# Patient Record
Sex: MALE | Race: WHITE | NOT HISPANIC OR LATINO | Employment: FULL TIME | URBAN - METROPOLITAN AREA
[De-identification: names, ages, dates, MRNs, and addresses within clinical notes are randomized per-mention and may not be internally consistent; named-entity substitution may affect disease eponyms.]

---

## 2021-04-19 ENCOUNTER — IMMUNIZATION (OUTPATIENT)
Dept: FAMILY MEDICINE | Facility: CLINIC | Age: 61
End: 2021-04-19
Payer: COMMERCIAL

## 2021-04-19 PROCEDURE — 91301 PR COVID VAC MODERNA 100 MCG/0.5 ML IM: CPT

## 2021-04-19 PROCEDURE — 0011A PR COVID VAC MODERNA 100 MCG/0.5 ML IM: CPT

## 2021-05-17 ENCOUNTER — IMMUNIZATION (OUTPATIENT)
Dept: FAMILY MEDICINE | Facility: CLINIC | Age: 61
End: 2021-05-17
Attending: FAMILY MEDICINE
Payer: COMMERCIAL

## 2021-05-17 PROCEDURE — 0012A PR COVID VAC MODERNA 100 MCG/0.5 ML IM: CPT

## 2021-05-17 PROCEDURE — 91301 PR COVID VAC MODERNA 100 MCG/0.5 ML IM: CPT

## 2021-05-23 ENCOUNTER — HEALTH MAINTENANCE LETTER (OUTPATIENT)
Age: 61
End: 2021-05-23

## 2021-09-12 ENCOUNTER — HEALTH MAINTENANCE LETTER (OUTPATIENT)
Age: 61
End: 2021-09-12

## 2022-06-19 ENCOUNTER — HEALTH MAINTENANCE LETTER (OUTPATIENT)
Age: 62
End: 2022-06-19

## 2022-11-19 ENCOUNTER — HEALTH MAINTENANCE LETTER (OUTPATIENT)
Age: 62
End: 2022-11-19

## 2023-01-25 ENCOUNTER — HOSPITAL ENCOUNTER (EMERGENCY)
Facility: CLINIC | Age: 63
Discharge: HOME | End: 2023-01-25
Attending: FAMILY MEDICINE | Admitting: FAMILY MEDICINE
Payer: COMMERCIAL

## 2023-01-25 ENCOUNTER — NURSE TRIAGE (OUTPATIENT)
Dept: FAMILY MEDICINE | Facility: CLINIC | Age: 63
End: 2023-01-25
Payer: COMMERCIAL

## 2023-01-25 VITALS
BODY MASS INDEX: 26.48 KG/M2 | HEIGHT: 70 IN | TEMPERATURE: 98.1 F | HEART RATE: 107 BPM | WEIGHT: 185 LBS | OXYGEN SATURATION: 96 % | RESPIRATION RATE: 18 BRPM | DIASTOLIC BLOOD PRESSURE: 116 MMHG | SYSTOLIC BLOOD PRESSURE: 180 MMHG

## 2023-01-25 DIAGNOSIS — I10 HYPERTENSION, UNSPECIFIED TYPE: ICD-10-CM

## 2023-01-25 PROCEDURE — 99283 EMERGENCY DEPT VISIT LOW MDM: CPT | Performed by: FAMILY MEDICINE

## 2023-01-25 RX ORDER — LISINOPRIL/HYDROCHLOROTHIAZIDE 10-12.5 MG
1 TABLET ORAL DAILY
Qty: 30 TABLET | Refills: 0 | Status: SHIPPED | OUTPATIENT
Start: 2023-01-25 | End: 2023-02-09

## 2023-01-25 NOTE — ED TRIAGE NOTES
BP fund to be high at dentist called clinic cant get in until February 9th, clinic advised he be seen today and start on meds.  Pt greg any pain dizziness SOB or chest pain. Reports he is the only one in family not on BP meds     Triage Assessment     Row Name 01/25/23 1437       Triage Assessment (Adult)    Airway WDL WDL       Respiratory WDL    Respiratory WDL WDL       Skin Circulation/Temperature WDL    Skin Circulation/Temperature WDL WDL       Cardiac WDL    Cardiac WDL WDL       Peripheral/Neurovascular WDL    Peripheral Neurovascular WDL WDL       Cognitive/Neuro/Behavioral WDL    Cognitive/Neuro/Behavioral WDL WDL

## 2023-01-25 NOTE — TELEPHONE ENCOUNTER
Provider Response to 2nd Level Triage Request    I have reviewed the RN documentation. My recommendation is:  Patient cannot wait until the establish care appointment.  He needs to go to urgent care or emergency department today.  He needs evaluation and start of medication to lower his blood pressure     Cory Raphael MD  Family Medicine

## 2023-01-25 NOTE — TELEPHONE ENCOUNTER
Dr. Raphael,    This patient has not seen a doctor in some 9 1/2 yrs with HTN now.  His bp was 174/110 at the dentis office.  I did call this patient. He denies cp, blurred vision, HA, SOA, difficulty using arms or legs and slurred speech.  He did schedule for an appt to establish care on 2/9/23.  Is this soon enough? I did tell him S & S to watch for and what to go to the ER for. Ana BURGOS RN

## 2023-01-25 NOTE — TELEPHONE ENCOUNTER
"174/110 this AM at the dentist - patient hasn't seen a provider at [place] in about 10 years but would like to re-establish care with a provider at the [facility].     Please advise and help patient schedule.     Callback number: [phone].    Reason for Disposition    Systolic BP >= 180 OR Diastolic >= 110    Additional Information    Negative: Sounds like a life-threatening emergency to the triager    Negative: Pregnant > 20 weeks or postpartum (< 6 weeks after delivery) and new hand or face swelling    Negative: Pregnant > 20 weeks and BP > 140/90    Negative: Systolic BP >= 160 OR Diastolic >= 100, and any cardiac or neurologic symptoms (e.g., chest pain, difficulty breathing, unsteady gait, blurred vision)    Negative: Patient sounds very sick or weak to the triager    Negative: BP Systolic BP >= 140 OR Diastolic >= 90 and postpartum (from 0 to 6 weeks after delivery)    Negative: Systolic BP >= 180 OR Diastolic >= 110, and missed most recent dose of blood pressure medication    Answer Assessment - Initial Assessment Questions  1. BLOOD PRESSURE: \"What is the blood pressure?\" \"Did you take at least two measurements 5 minutes apart?\"      174/110 - taken at the dentist.   2. ONSET: \"When did you take your blood pressure?\"      8 AM.   3. HOW: \"How did you obtain the blood pressure?\" (e.g., visiting nurse, automatic home BP monitor)      At the dentist - automatic.   4. HISTORY: \"Do you have a history of high blood pressure?\"      Patient reports his BP usually runs 140's - 150's.   5. MEDICATIONS: \"Are you taking any medications for blood pressure?\" \"Have you missed any doses recently?\"      NO   6. OTHER SYMPTOMS: \"Do you have any symptoms?\" (e.g., headache, chest pain, blurred vision, difficulty breathing, weakness)      None of the above.   7. PREGNANCY: \"Is there any chance you are pregnant?\" \"When was your last menstrual period?\"      N/a    Protocols used: HIGH BLOOD PRESSURE-A-VENESSA Thapa, " BRIA  [facility]

## 2023-01-26 NOTE — DISCHARGE INSTRUCTIONS
RETURN TO THE EMERGENCY ROOM FOR THE FOLLOWING:    Severely worsened breathing, new chest pain, severe headache or neck pain, one-sided weakness or incoordination, trouble with speech, new changes in vision, vertigo and vomiting, or at anytime for any concern.    FOLLOW UP:    With your primary clinic as scheduled.    TREATMENT RECOMMENDATIONS:    Lisinopril/hydrochlorothiazide.    NURSE ADVICE LINE:  [phone] or [phone]

## 2023-01-26 NOTE — ED PROVIDER NOTES
HPI   The patient is a 62-year-old male presenting for concerns about blood pressure.  He was at the dentist today when they found that his blood pressure was quite high.  They recommended he be seen for high blood pressure and consider starting medication.  Patient tells me that he has high blood pressure and that it is usually about 150/80.  The last time he measured his blood pressure though was 6 months ago.  He denies chest pain or trouble with breathing.  No difficulties with exertion.  No palpitations or lightheadedness or fainting.  No severe headache or neck pain.  No back, neck, jaw, or arm pain.  No one-sided weakness or incoordination.  No vertigo or repeated vomiting.  No visual changes.  No trouble with speech.  No facial droop.        Allergies:  Allergies   Allergen Reactions     Nkda [No Known Drug Allergies]      Problem List:    Patient Active Problem List    Diagnosis Date Noted     CARDIOVASCULAR SCREENING; LDL GOAL LESS THAN 130 11/21/2012     Priority: Medium     Inguinal ring laxity 10/31/2011     Priority: Medium     Erectile dysfunction 10/31/2011     Priority: Medium     Carpal tunnel syndrome 02/14/2007     Priority: Medium      Past Medical History:    History reviewed. No pertinent past medical history.  Past Surgical History:    Past Surgical History:   Procedure Laterality Date     CARPAL TUNNEL RELEASE RT/LT  1-12-12    Right side; MO CasanovaALICIA.      CARPAL TUNNEL RELEASE RT/LT  2008    MO MunroeALICIA     RELEASE CARPAL TUNNEL  1/12/2012    Procedure:RELEASE CARPAL TUNNEL; Right Carpal Tunnel Release; Surgeon:REGULO THOMAS; Location:[place] OR     VASECTOMY  10/6/1989     Family History:    Family History   Problem Relation Age of Onset     Diabetes Mother      Heart Disease Father         MI at age 62.     Lipids Father      Social History:  Marital Status:   [4]  Social History     Tobacco Use     Smoking status: Former     Packs/day: 0.00     Types:  "Cigarettes     Smokeless tobacco: Never     Tobacco comments:     Quit in March 2008.   Substance Use Topics     Alcohol use: Yes     Comment: 5 beers per week.  None [id number] visit.     Drug use: No      Medications:    lisinopril-hydrochlorothiazide (ZESTORETIC) 10-12.5 MG tablet  doxycycline (VIBRAMYCIN) 100 MG capsule  ORDER FOR DME  tadalafil (CIALIS) 10 MG tablet      Review of Systems   All other systems reviewed and are negative.      PE   BP: (!) 188/116  Pulse: (!) 125  Temp: 98.1  F (36.7  C)  Resp: 18  Height: 177.8 cm (5' 10\")  Weight: 83.9 kg (185 lb)  SpO2: 98 %  Physical Exam  Vitals and nursing note reviewed.   Constitutional:       General: He is not in acute distress.  HENT:      Head: Atraumatic.      Right Ear: External ear normal.      Left Ear: External ear normal.      Nose: Nose normal.      Mouth/Throat:      Mouth: Mucous membranes are moist.      Pharynx: Oropharynx is clear.   Eyes:      General: No scleral icterus.     Extraocular Movements: Extraocular movements intact.      Conjunctiva/sclera: Conjunctivae normal.      Pupils: Pupils are equal, round, and reactive to light.   Cardiovascular:      Rate and Rhythm: Normal rate.   Pulmonary:      Effort: Pulmonary effort is normal. No respiratory distress.   Musculoskeletal:         General: Normal range of motion.      Cervical back: Normal range of motion.   Skin:     General: Skin is warm and dry.   Neurological:      Mental Status: He is alert and oriented to person, place, and time.   Psychiatric:         Behavior: Behavior normal.         ED COURSE and [facility]   1839.  The patient has high blood pressure here in the ED.  He is without symptoms of endorgan involvement.  The patient would like to start blood pressure medication.  Lisinopril/hydrochlorothiazide ordered.  No blood work or other work-up at this time.  Follow-up is already scheduled on 2/9.  Return here for worsening as discussed.    1840.  The patient, their parent if " applicable, and/or their medical decision maker(s) and I have reviewed all of the available historical information, applicable PMH, physical exam findings, and objective diagnostic data gathered during this ED visit.  We then discussed all work-up options and then together agreed upon the course taken during this visit.  The ultimate disposition and plan was a cooperative decision made between myself and the patient, their parent if applicable, and/or their legal decision maker(s).  The risks and benefits of all decisions made during this visit were discussed to the best of my abilities given the circumstances, and all parties are understanding of the pertinent ramifications of these decisions.      LABS  Labs Ordered and Resulted from Time of ED Arrival to Time of ED Departure - No data to display    IMAGING  Images reviewed by me.  Radiology report also reviewed.  No orders to display       Procedures    Medications - No data to display      IMPRESSION       ICD-10-CM    1. Hypertension, unspecified type  I10                Medication List      Started    lisinopril-hydrochlorothiazide 10-12.5 MG tablet  Commonly known as: ZESTORETIC  1 tablet, Oral, DAILY                        New Gomez MD  01/25/23 4732

## 2023-02-09 ENCOUNTER — OFFICE VISIT (OUTPATIENT)
Dept: FAMILY MEDICINE | Facility: CLINIC | Age: 63
End: 2023-02-09
Payer: COMMERCIAL

## 2023-02-09 VITALS
HEIGHT: 70 IN | WEIGHT: 184.7 LBS | SYSTOLIC BLOOD PRESSURE: 138 MMHG | TEMPERATURE: 98.1 F | RESPIRATION RATE: 16 BRPM | HEART RATE: 82 BPM | DIASTOLIC BLOOD PRESSURE: 80 MMHG | OXYGEN SATURATION: 99 % | BODY MASS INDEX: 26.44 KG/M2

## 2023-02-09 DIAGNOSIS — Z11.4 SCREENING FOR HIV (HUMAN IMMUNODEFICIENCY VIRUS): ICD-10-CM

## 2023-02-09 DIAGNOSIS — Z11.59 NEED FOR HEPATITIS C SCREENING TEST: ICD-10-CM

## 2023-02-09 DIAGNOSIS — Z12.11 SCREEN FOR COLON CANCER: ICD-10-CM

## 2023-02-09 DIAGNOSIS — Z13.220 SCREENING FOR HYPERLIPIDEMIA: ICD-10-CM

## 2023-02-09 DIAGNOSIS — I10 BENIGN ESSENTIAL HTN: ICD-10-CM

## 2023-02-09 DIAGNOSIS — Z23 NEED FOR VACCINATION: Primary | ICD-10-CM

## 2023-02-09 LAB
ANION GAP SERPL CALCULATED.3IONS-SCNC: 11 MMOL/L (ref 7–15)
BUN SERPL-MCNC: 17.4 MG/DL (ref 8–23)
CALCIUM SERPL-MCNC: 9.7 MG/DL (ref 8.8–10.2)
CHLORIDE SERPL-SCNC: 90 MMOL/L (ref 98–107)
CHOLEST SERPL-MCNC: 208 MG/DL
CREAT SERPL-MCNC: 1.21 MG/DL (ref 0.67–1.17)
DEPRECATED HCO3 PLAS-SCNC: 27 MMOL/L (ref 22–29)
GFR SERPL CREATININE-BSD FRML MDRD: 68 ML/MIN/1.73M2
GLUCOSE SERPL-MCNC: 111 MG/DL (ref 70–99)
HDLC SERPL-MCNC: 61 MG/DL
LDLC SERPL CALC-MCNC: 134 MG/DL
NONHDLC SERPL-MCNC: 147 MG/DL
POTASSIUM SERPL-SCNC: 4.3 MMOL/L (ref 3.4–5.3)
SODIUM SERPL-SCNC: 128 MMOL/L (ref 136–145)
TRIGL SERPL-MCNC: 64 MG/DL

## 2023-02-09 PROCEDURE — 87389 HIV-1 AG W/HIV-1&-2 AB AG IA: CPT | Performed by: STUDENT IN AN ORGANIZED HEALTH CARE EDUCATION/TRAINING PROGRAM

## 2023-02-09 PROCEDURE — 90750 HZV VACC RECOMBINANT IM: CPT | Performed by: STUDENT IN AN ORGANIZED HEALTH CARE EDUCATION/TRAINING PROGRAM

## 2023-02-09 PROCEDURE — 99203 OFFICE O/P NEW LOW 30 MIN: CPT | Mod: 25 | Performed by: STUDENT IN AN ORGANIZED HEALTH CARE EDUCATION/TRAINING PROGRAM

## 2023-02-09 PROCEDURE — 80061 LIPID PANEL: CPT | Performed by: STUDENT IN AN ORGANIZED HEALTH CARE EDUCATION/TRAINING PROGRAM

## 2023-02-09 PROCEDURE — 36415 COLL VENOUS BLD VENIPUNCTURE: CPT | Performed by: STUDENT IN AN ORGANIZED HEALTH CARE EDUCATION/TRAINING PROGRAM

## 2023-02-09 PROCEDURE — 80048 BASIC METABOLIC PNL TOTAL CA: CPT | Performed by: STUDENT IN AN ORGANIZED HEALTH CARE EDUCATION/TRAINING PROGRAM

## 2023-02-09 PROCEDURE — 86803 HEPATITIS C AB TEST: CPT | Performed by: STUDENT IN AN ORGANIZED HEALTH CARE EDUCATION/TRAINING PROGRAM

## 2023-02-09 PROCEDURE — 90471 IMMUNIZATION ADMIN: CPT | Performed by: STUDENT IN AN ORGANIZED HEALTH CARE EDUCATION/TRAINING PROGRAM

## 2023-02-09 RX ORDER — LISINOPRIL/HYDROCHLOROTHIAZIDE 10-12.5 MG
1 TABLET ORAL DAILY
Qty: 90 TABLET | Refills: 3 | Status: SHIPPED | OUTPATIENT
Start: 2023-02-09 | End: 2024-01-26

## 2023-02-09 RX ORDER — LISINOPRIL/HYDROCHLOROTHIAZIDE 10-12.5 MG
1 TABLET ORAL DAILY
Qty: 30 TABLET | Refills: 0 | Status: SHIPPED | OUTPATIENT
Start: 2023-02-09 | End: 2023-02-09

## 2023-02-09 ASSESSMENT — PAIN SCALES - GENERAL: PAINLEVEL: NO PAIN (0)

## 2023-02-09 NOTE — NURSING NOTE
Immunizations Administered     Name Date Dose VIS Date Route    Zoster vaccine recombinant adjuvanted (SHINGRIX) 2/9/23  8:12 AM 0.5 mL 10/30/2019, Given Today Intramuscular        After obtaining informed consent, the SHINGRIX shingles immunization is given in the left deltoid. Patient verified all immunization questions prior to vaccination. Patient is remaining in clinic for 15 minutes to monitor for adverse reactions.    MO Álvarez LPN on 2/9/2023 at 8:29 AM\

## 2023-02-09 NOTE — PROGRESS NOTES
"  1. Benign essential HTN  - refilled lisinopril-hydrochlorothiazide (ZESTORETIC) 10-12.5 MG tablet; Take 1 tablet by mouth daily  Dispense: 90 tablet; Refill: 3  - Basic metabolic panel  (Ca, Cl, CO2, Creat, Gluc, K, Na, BUN); Future    2. Need for vaccination  - administered today   - ZOSTER VACCINE RECOMBINANT ADJUVANTED (SHINGRIX)    3. Screening for HIV (human immunodeficiency virus)  - HIV Antigen Antibody Combo; Future    4. Need for hepatitis C screening test  - Hepatitis C Screen Reflex to HCV RNA Quant and Genotype; Future    5. Screening for hyperlipidemia  - Lipid panel reflex to direct LDL Non-fasting; Future    6. Screen for colon cancer  - can discuss at annual physical in addition to PSA       Subjective   Al is a 62 year old, presenting for the following health issues:  Hypertension, Establish Care, and Recheck Medication      History of Present Illness       Hypertension: He presents for follow up of hypertension.  He does check blood pressure  regularly outside of the clinic. Outpatient blood pressures have not been over 140/90. He does not follow a low salt diet.       Medication Followup of Lisinopril-hydrochlorothiazide 10-12.5mg    Taking Medication as prescribed: yes    Side Effects:  None    Medication Helping Symptoms:  yes        Review of Systems   Denies headache, vision changes, chest pain, shortness of breath   As above       Objective    /80 (BP Location: Left arm, Patient Position: Sitting, Cuff Size: Adult Large)   Pulse 82   Temp 98.1  F (36.7  C) (Tympanic)   Resp 16   Ht 1.778 m (5' 10\")   Wt 83.8 kg (184 lb 11.2 oz)   SpO2 99%   BMI 26.50 kg/m    Body mass index is 26.5 kg/m .  Physical Exam  Constitutional:       General: He is not in acute distress.  HENT:      Head: Normocephalic and atraumatic.      Right Ear: External ear normal.      Left Ear: External ear normal.      Mouth/Throat:      Mouth: Mucous membranes are moist.      Pharynx: Oropharynx is clear. No " oropharyngeal exudate or posterior oropharyngeal erythema.   Eyes:      Extraocular Movements: Extraocular movements intact.   Cardiovascular:      Rate and Rhythm: Normal rate and regular rhythm.      Pulses: Normal pulses.      Heart sounds: Normal heart sounds. No murmur heard.    No friction rub. No gallop.   Pulmonary:      Effort: Pulmonary effort is normal. No respiratory distress.      Breath sounds: Normal breath sounds. No wheezing or rhonchi.   Abdominal:      Palpations: Abdomen is soft. There is no mass.      Tenderness: There is no abdominal tenderness.   Musculoskeletal:         General: No deformity. Normal range of motion.      Cervical back: Normal range of motion and neck supple.   Skin:     General: Skin is warm.      Findings: No rash.   Neurological:      General: No focal deficit present.      Mental Status: He is alert and oriented to person, place, and time.   Psychiatric:         Mood and Affect: Mood normal.

## 2023-02-10 DIAGNOSIS — E78.2 MIXED HYPERLIPIDEMIA: ICD-10-CM

## 2023-02-10 DIAGNOSIS — E87.1 HYPONATREMIA: Primary | ICD-10-CM

## 2023-02-10 LAB
HCV AB SERPL QL IA: NONREACTIVE
HIV 1+2 AB+HIV1 P24 AG SERPL QL IA: NONREACTIVE

## 2023-02-10 RX ORDER — ATORVASTATIN CALCIUM 40 MG/1
40 TABLET, FILM COATED ORAL DAILY
Qty: 90 TABLET | Refills: 1 | Status: SHIPPED | OUTPATIENT
Start: 2023-02-10 | End: 2024-01-02

## 2023-02-10 NOTE — RESULT ENCOUNTER NOTE
Kelsi Monson,     It was a pleasure speaking with you. I have received and reviewed your lab results, and have the following recommendations:     Congratulations!  Test negative for hepatitis C and HIV.  Your BMP did show that you have an elevated creatinine and a mildly decreased sodium.  At this time I would recommend keeping yourself adequately hydrated getting at least 64 ounces of daily water intake. I do kindly request that you provide repeat blood work in the next 2 weeks to ensure that your sodium and creatinine are stabilizing and that no further work-up would be needed.  I placed an order for this to be done you does have to call the lab and make an appointment.    Finally your cholesterol was mildly elevated.  I think you would benefit from a medication called atorvastatin.  I will place an order over to your pharmacy.  Some people on this medication notice muscle pains if you do notice this, please stop taking the atorvastatin.    Try to limit your dietary intake of fatty, greasy, oily, fried, take out, and fast food when possible. Also, I would suggest you cut back on red and processed meats, sodium and sugar-sweetened foods and beverages. Regarding exercise, please get at least 30 minutes of CONTINUOUS moderate intensity aerobic exercise at least 3 times per week.    Sincerely,     Claudette Gayle MD

## 2023-02-24 ENCOUNTER — LAB (OUTPATIENT)
Dept: LAB | Facility: CLINIC | Age: 63
End: 2023-02-24
Payer: COMMERCIAL

## 2023-02-24 DIAGNOSIS — E87.1 HYPONATREMIA: ICD-10-CM

## 2023-02-24 LAB
ALBUMIN SERPL BCG-MCNC: 4.5 G/DL (ref 3.5–5.2)
ALP SERPL-CCNC: 63 U/L (ref 40–129)
ALT SERPL W P-5'-P-CCNC: 30 U/L (ref 10–50)
ANION GAP SERPL CALCULATED.3IONS-SCNC: 12 MMOL/L (ref 7–15)
AST SERPL W P-5'-P-CCNC: 31 U/L (ref 10–50)
BILIRUB SERPL-MCNC: 1.1 MG/DL
BUN SERPL-MCNC: 19.7 MG/DL (ref 8–23)
CALCIUM SERPL-MCNC: 9.5 MG/DL (ref 8.8–10.2)
CHLORIDE SERPL-SCNC: 93 MMOL/L (ref 98–107)
CREAT SERPL-MCNC: 1.36 MG/DL (ref 0.67–1.17)
DEPRECATED HCO3 PLAS-SCNC: 24 MMOL/L (ref 22–29)
GFR SERPL CREATININE-BSD FRML MDRD: 59 ML/MIN/1.73M2
GLUCOSE SERPL-MCNC: 105 MG/DL (ref 70–99)
POTASSIUM SERPL-SCNC: 4.4 MMOL/L (ref 3.4–5.3)
PROT SERPL-MCNC: 7.1 G/DL (ref 6.4–8.3)
SODIUM SERPL-SCNC: 129 MMOL/L (ref 136–145)

## 2023-02-24 PROCEDURE — 80053 COMPREHEN METABOLIC PANEL: CPT

## 2023-02-24 PROCEDURE — 36415 COLL VENOUS BLD VENIPUNCTURE: CPT

## 2023-02-24 NOTE — RESULT ENCOUNTER NOTE
Hello!     Whenever anybody gets a chance could someone please call this patient and schedule him to see me either in person or via a virtual visit to discuss his kidney function?  He already got repeat lab work and his creatinine appears to be increasing so I would like to follow-up with the patient to see what can potentially be causing these abnormalities.    Thank you so much!     Sincerely,     Ana Gayle MD            Dear Al,     Thank you so much for completing those repeat labs like I requested.  Unfortunately, appears that your creatinine continues to remain elevated.  At this time I think it might be best if we have an appointment together face-to-face or virtual is fine just to figure out what exactly could potentially be causing this elevated creatinine.  Sometimes the patient has longstanding blood pressure that can result in kidney disease over time.  However based on our last encounter it appears that your blood pressure was normal that day.    Given the fact that your kidney function appears to be slightly worse than before, the next time we have our appointment together I will also place an order for a urine albumin study to make sure that your kidneys can still filter out protein appropriately.  Otherwise, you may need a referral to a kidney specialist.    Please let us know if you have any questions or concerns.  I will have somebody from clinic make an appointment with you to see me again.    Regards,  ANA GAYLE MD

## 2023-04-10 ENCOUNTER — ALLIED HEALTH/NURSE VISIT (OUTPATIENT)
Dept: FAMILY MEDICINE | Facility: CLINIC | Age: 63
End: 2023-04-10
Payer: COMMERCIAL

## 2023-04-10 DIAGNOSIS — Z23 ENCOUNTER FOR IMMUNIZATION: Primary | ICD-10-CM

## 2023-04-10 PROCEDURE — 90750 HZV VACC RECOMBINANT IM: CPT

## 2023-04-10 PROCEDURE — 90471 IMMUNIZATION ADMIN: CPT

## 2023-04-10 PROCEDURE — 99207 PR NO CHARGE NURSE ONLY: CPT

## 2023-04-10 NOTE — PROGRESS NOTES
Prior to immunization administration, verified patients identity using patient s name and date of birth. Please see Immunization Activity for additional information.     Screening Questionnaire for Adult Immunization    Are you sick today?   No   Do you have allergies to medications, food, a vaccine component or latex?   No   Have you ever had a serious reaction after receiving a vaccination?   No   Do you have a long-term health problem with heart, lung, kidney, or metabolic disease (e.g., diabetes), asthma, a blood disorder, no spleen, complement component deficiency, a cochlear implant, or a spinal fluid leak?  Are you on long-term aspirin therapy?   No   Do you have cancer, leukemia, HIV/AIDS, or any other immune system problem?   No   Do you have a parent, brother, or sister with an immune system problem?   No   In the past 3 months, have you taken medications that affect  your immune system, such as prednisone, other steroids, or anticancer drugs; drugs for the treatment of rheumatoid arthritis, Crohn s disease, or psoriasis; or have you had radiation treatments?   No   Have you had a seizure, or a brain or other nervous system problem?   No   During the past year, have you received a transfusion of blood or blood    products, or been given immune (gamma) globulin or antiviral drug?   No   For women: Are you pregnant or is there a chance you could become       pregnant during the next month?   No   Have you received any vaccinations in the past 4 weeks?   No     Immunization questionnaire answers were all negative.    I have reviewed the following standing orders:   This patient is due and qualifies for the Zoster vaccine.    Click here for Zoster Standing Order    I have reviewed the vaccines inclusion and exclusion criteria; No concerns regarding eligibility.         Injection of shingrix given by Cassi Perez CMA. Patient instructed to remain in clinic for 15 minutes afterwards, and to report any  adverse reactions.     Screening performed by Cassi Perez CMA on 4/10/2023 at 8:21 AM.

## 2023-04-14 ENCOUNTER — TELEPHONE (OUTPATIENT)
Dept: FAMILY MEDICINE | Facility: CLINIC | Age: 63
End: 2023-04-14
Payer: COMMERCIAL

## 2023-04-14 NOTE — TELEPHONE ENCOUNTER
Patient Quality Outreach    Patient is due for the following:   Colon Cancer Screening  Physical Preventive Adult Physical    Next Steps:   Schedule a Adult Preventative    Type of outreach:    Sent letter.    Next Steps:  Reach out within 90 days via Letter.    Max number of attempts reached: No. Will try again in 90 days if patient still on fail list.    Questions for provider review:    None     Caren LOUIS LPN

## 2023-04-14 NOTE — LETTER
April 14, 2023      Ke Monson  [address] [address]    Your team at [facility] cares about your health. We have reviewed your chart and based on our findings; we are making the following recommendations to better manage your health.     You are in particular need of attention regarding the following:     Call or MyChart message your clinic to schedule a colonoscopy, schedule/ a FIT Test, or order a Cologuard test. If you are unsure what type of test you need, please call your clinic and speak to clinic staff.   Colon cancer is now the second leading cause of cancer-related deaths in the [facility] for both men and women and there are over 130,000 new cases and 50,000 deaths per year from colon cancer. Colonoscopies can prevent 90-95% of these deaths. Problem lesions can be removed before they ever become cancer. This test is not only looking for cancer, but also getting rid of precancerous lesions.   If you are under/uninsured, we recommend you contact the [employer] Program.[employer] is a free colorectal cancer screening program that provides colonoscopies for eligible under/uninsured [place] men and women. If you are interested in receiving a free colonoscopy, please call [employer] at t [phone] (mention code [employer]) to see if you're eligible. Please have them send us the results.   PREVENTATIVE VISIT: Physical    If you have already completed these items, please contact the clinic via phone or   [email or web address] so your care team can review and update your records. Thank you for   choosing [facility]ics for your healthcare needs. For any questions,   concerns, or to schedule an appointment please contact our clinic at [phone].    Healthy Regards,      Your [facility] Care Team

## 2023-07-02 ENCOUNTER — HEALTH MAINTENANCE LETTER (OUTPATIENT)
Age: 63
End: 2023-07-02

## 2023-08-01 ENCOUNTER — TELEPHONE (OUTPATIENT)
Dept: FAMILY MEDICINE | Facility: CLINIC | Age: 63
End: 2023-08-01
Payer: COMMERCIAL

## 2023-08-01 NOTE — LETTER
August 1, 2023      Ke Monson  [address] [address]    Your team at [facility] cares about your health. We have reviewed your chart and based on our findings; we are making the following recommendations to better manage your health.     You are in particular need of attention regarding the following:     HYPERTENSION FOLLOW UP: Office Visit  Call or MyChart message your clinic to schedule a colonoscopy, schedule/ a FIT Test, or order a Cologuard test. If you are unsure what type of test you need, please call your clinic and speak to clinic staff.   Colon cancer is now the second leading cause of cancer-related deaths in the [facility] for both men and women and there are over 130,000 new cases and 50,000 deaths per year from colon cancer. Colonoscopies can prevent 90-95% of these deaths. Problem lesions can be removed before they ever become cancer. This test is not only looking for cancer, but also getting rid of precancerous lesions.   If you are under/uninsured, we recommend you contact the [employer] Program.[employer] is a free colorectal cancer screening program that provides colonoscopies for eligible under/uninsured [place] men and women. If you are interested in receiving a free colonoscopy, please call [employer] at t [phone] (mention code [employer]) to see if you're eligible. Please have them send us the results.   PREVENTATIVE VISIT: Physical    If you have already completed these items, please contact the clinic via phone or   [email or web address] so your care team can review and update your records. Thank you for   choosing [facility] Clinics for your healthcare needs. For any questions,   concerns, or to schedule an appointment please contact our clinic at [phone].    Healthy Regards,      Your [facility] Care Team

## 2023-08-01 NOTE — TELEPHONE ENCOUNTER
Patient Quality Outreach    Patient is due for the following:   Hypertension -  Hypertension follow-up visit  Colon Cancer Screening  Physical Preventive Adult Physical    Next Steps:   Schedule a Adult Preventative    Type of outreach:    Sent letter.    Next Steps:  Reach out within 90 days via Letter.    Max number of attempts reached: Yes. Will try again in 90 days if patient still on fail list.    Questions for provider review:    None           Caren LOUIS LPN

## 2023-11-24 ENCOUNTER — TELEPHONE (OUTPATIENT)
Dept: FAMILY MEDICINE | Facility: CLINIC | Age: 63
End: 2023-11-24
Payer: COMMERCIAL

## 2023-11-24 NOTE — TELEPHONE ENCOUNTER
Patient Quality Outreach    Patient is due for the following:   Colon Cancer Screening  Physical Preventive Adult Physical    Next Steps:   Schedule a Adult Preventative    Type of outreach:    Sent [employer] message.    Next Steps:  Reach out within 90 days via Letter.    Max number of attempts reached: Yes. Will try again in 90 days if patient still on fail list.    Questions for provider review:    None           Caren LOUIS LPN

## 2024-01-02 ENCOUNTER — MYC REFILL (OUTPATIENT)
Dept: FAMILY MEDICINE | Facility: CLINIC | Age: 64
End: 2024-01-02
Payer: COMMERCIAL

## 2024-01-02 DIAGNOSIS — E78.2 MIXED HYPERLIPIDEMIA: ICD-10-CM

## 2024-01-02 DIAGNOSIS — I10 BENIGN ESSENTIAL HTN: ICD-10-CM

## 2024-01-03 RX ORDER — LISINOPRIL/HYDROCHLOROTHIAZIDE 10-12.5 MG
1 TABLET ORAL DAILY
Qty: 90 TABLET | Refills: 3 | OUTPATIENT
Start: 2024-01-03

## 2024-01-03 RX ORDER — ATORVASTATIN CALCIUM 40 MG/1
40 TABLET, FILM COATED ORAL DAILY
Qty: 90 TABLET | Refills: 1 | Status: SHIPPED | OUTPATIENT
Start: 2024-01-03 | End: 2024-01-26

## 2024-01-03 NOTE — TELEPHONE ENCOUNTER
Before I can refill their lisinopril-hydrochlorothiazide, the patient will need to provide blood work to ensure that their creatinine/kidney function is not increasing. They can call the lab to schedule an appointment I have placed an order for a BMP.

## 2024-01-26 ENCOUNTER — OFFICE VISIT (OUTPATIENT)
Dept: FAMILY MEDICINE | Facility: CLINIC | Age: 64
End: 2024-01-26
Payer: COMMERCIAL

## 2024-01-26 VITALS
OXYGEN SATURATION: 99 % | WEIGHT: 187.9 LBS | HEIGHT: 70 IN | TEMPERATURE: 98.3 F | DIASTOLIC BLOOD PRESSURE: 82 MMHG | SYSTOLIC BLOOD PRESSURE: 130 MMHG | HEART RATE: 85 BPM | RESPIRATION RATE: 20 BRPM | BODY MASS INDEX: 26.9 KG/M2

## 2024-01-26 DIAGNOSIS — E78.2 MIXED HYPERLIPIDEMIA: ICD-10-CM

## 2024-01-26 DIAGNOSIS — R79.89 ELEVATED SERUM CREATININE: ICD-10-CM

## 2024-01-26 DIAGNOSIS — Z12.11 SCREEN FOR COLON CANCER: ICD-10-CM

## 2024-01-26 DIAGNOSIS — I10 BENIGN ESSENTIAL HTN: Primary | ICD-10-CM

## 2024-01-26 LAB
ANION GAP SERPL CALCULATED.3IONS-SCNC: 13 MMOL/L (ref 7–15)
BUN SERPL-MCNC: 19.7 MG/DL (ref 8–23)
CALCIUM SERPL-MCNC: 9.6 MG/DL (ref 8.8–10.2)
CHLORIDE SERPL-SCNC: 95 MMOL/L (ref 98–107)
CHOLEST SERPL-MCNC: 147 MG/DL
CREAT SERPL-MCNC: 1.25 MG/DL (ref 0.67–1.17)
CREAT UR-MCNC: 46.5 MG/DL
DEPRECATED HCO3 PLAS-SCNC: 25 MMOL/L (ref 22–29)
EGFRCR SERPLBLD CKD-EPI 2021: 65 ML/MIN/1.73M2
ERYTHROCYTE [DISTWIDTH] IN BLOOD BY AUTOMATED COUNT: 11.7 % (ref 10–15)
FASTING STATUS PATIENT QL REPORTED: YES
GLUCOSE SERPL-MCNC: 104 MG/DL (ref 70–99)
HCT VFR BLD AUTO: 43.1 % (ref 40–53)
HDLC SERPL-MCNC: 56 MG/DL
HGB BLD-MCNC: 15.1 G/DL (ref 13.3–17.7)
LDLC SERPL CALC-MCNC: 73 MG/DL
MCH RBC QN AUTO: 33 PG (ref 26.5–33)
MCHC RBC AUTO-ENTMCNC: 35 G/DL (ref 31.5–36.5)
MCV RBC AUTO: 94 FL (ref 78–100)
MICROALBUMIN UR-MCNC: <12 MG/L
MICROALBUMIN/CREAT UR: NORMAL MG/G{CREAT}
NONHDLC SERPL-MCNC: 91 MG/DL
PLATELET # BLD AUTO: 199 10E3/UL (ref 150–450)
POTASSIUM SERPL-SCNC: 4.3 MMOL/L (ref 3.4–5.3)
RBC # BLD AUTO: 4.57 10E6/UL (ref 4.4–5.9)
SODIUM SERPL-SCNC: 133 MMOL/L (ref 135–145)
TRIGL SERPL-MCNC: 89 MG/DL
WBC # BLD AUTO: 14.5 10E3/UL (ref 4–11)

## 2024-01-26 PROCEDURE — 82043 UR ALBUMIN QUANTITATIVE: CPT | Performed by: STUDENT IN AN ORGANIZED HEALTH CARE EDUCATION/TRAINING PROGRAM

## 2024-01-26 PROCEDURE — 36415 COLL VENOUS BLD VENIPUNCTURE: CPT | Performed by: STUDENT IN AN ORGANIZED HEALTH CARE EDUCATION/TRAINING PROGRAM

## 2024-01-26 PROCEDURE — 80061 LIPID PANEL: CPT | Performed by: STUDENT IN AN ORGANIZED HEALTH CARE EDUCATION/TRAINING PROGRAM

## 2024-01-26 PROCEDURE — 82570 ASSAY OF URINE CREATININE: CPT | Performed by: STUDENT IN AN ORGANIZED HEALTH CARE EDUCATION/TRAINING PROGRAM

## 2024-01-26 PROCEDURE — 85027 COMPLETE CBC AUTOMATED: CPT | Performed by: STUDENT IN AN ORGANIZED HEALTH CARE EDUCATION/TRAINING PROGRAM

## 2024-01-26 PROCEDURE — 80048 BASIC METABOLIC PNL TOTAL CA: CPT | Performed by: STUDENT IN AN ORGANIZED HEALTH CARE EDUCATION/TRAINING PROGRAM

## 2024-01-26 PROCEDURE — 99213 OFFICE O/P EST LOW 20 MIN: CPT | Performed by: STUDENT IN AN ORGANIZED HEALTH CARE EDUCATION/TRAINING PROGRAM

## 2024-01-26 RX ORDER — LISINOPRIL/HYDROCHLOROTHIAZIDE 10-12.5 MG
1 TABLET ORAL DAILY
Qty: 90 TABLET | Refills: 3 | Status: SHIPPED | OUTPATIENT
Start: 2024-01-26

## 2024-01-26 RX ORDER — ATORVASTATIN CALCIUM 40 MG/1
40 TABLET, FILM COATED ORAL DAILY
Qty: 90 TABLET | Refills: 3 | Status: SHIPPED | OUTPATIENT
Start: 2024-01-26

## 2024-01-26 ASSESSMENT — PAIN SCALES - GENERAL: PAINLEVEL: NO PAIN (0)

## 2024-01-26 NOTE — PROGRESS NOTES
1. Benign essential hypertension, well-controlled at goal of less than 140/90  2.  Elevated creatinine, suspect in the setting of hypertension   > May checks his blood pressures at home those values are lower than they were in clinic today  -Refilled lisinopril-hydrochlorothiazide (ZESTORETIC) 10-12.5 MG tablet; Take 1 tablet by mouth daily  Dispense: 90 tablet; Refill: 3  - Basic metabolic panel  (Ca, Cl, CO2, Creat, Gluc, K, Na, BUN); Future  - CBC with platelets; Future  - Albumin Random Urine Quantitative with Creat Ratio; Future    3. Mixed hyperlipidemia  > Denies any side effects from the Lipitor  - atorvastatin (LIPITOR) 40 MG tablet; Take 1 tablet (40 mg) by mouth daily  Dispense: 90 tablet; Refill: 3  - Lipid panel reflex to direct LDL Fasting; Future    4. Screen for colon cancer  > Patient denies any family or personal history of colon cancer  - Fecal colorectal cancer screen (FIT); Future    Follow-up plan:   -If repeat creatinine levels are elevated by 30% or more, may need to consider changing from ACE to alternative antihypertensive    Subjective   Al is a 63 year old, presenting for the following health issues:  Recheck Medication, Hypertension, and Lipids        1/26/2024     8:33 AM   Additional Questions   Roomed by Caren LOUIS LPN   Accompanied by self         1/26/2024     8:33 AM   Patient Reported Additional Medications   Patient reports taking the following new medications no new meds     History of Present Illness       Hyperlipidemia:  He presents for follow up of hyperlipidemia.   He is taking medication to lower cholesterol. He is not having myalgia or other side effects to statin medications.    Hypertension: He presents for follow up of hypertension.  He does check blood pressure  regularly outside of the clinic. Outpatient blood pressures have not been over 140/90. He does not follow a low salt diet.     He eats 2-3 servings of fruits and vegetables daily.He consumes 0 sweetened  "beverage(s) daily.He exercises with enough effort to increase his heart rate 9 or less minutes per day.  He exercises with enough effort to increase his heart rate 3 or less days per week.   He is taking medications regularly.       Medication Followup of Lisinopril-hydrochlorothiazide 10-12.5 mg  Taking Medication as prescribed: yes  Side Effects:  None  Medication Helping Symptoms:  yes    ROS:   As above         Objective    /82 (BP Location: Right arm, Patient Position: Sitting, Cuff Size: Adult Regular)   Pulse 85   Temp 98.3  F (36.8  C) (Tympanic)   Resp 20   Ht 1.78 m (5' 10.08\")   Wt 85.2 kg (187 lb 14.4 oz)   SpO2 99%   BMI 26.90 kg/m    Body mass index is 26.9 kg/m .  Physical Exam  Constitutional:       General: He is not in acute distress.  HENT:      Head: Normocephalic and atraumatic.      Right Ear: External ear normal.      Left Ear: External ear normal.      Mouth/Throat:      Mouth: Mucous membranes are moist.      Pharynx: Oropharynx is clear. No oropharyngeal exudate or posterior oropharyngeal erythema.   Eyes:      Extraocular Movements: Extraocular movements intact.   Cardiovascular:      Rate and Rhythm: Normal rate and regular rhythm.      Heart sounds: Normal heart sounds.   Pulmonary:      Effort: Pulmonary effort is normal. No respiratory distress.      Breath sounds: Normal breath sounds. No wheezing or rhonchi.   Abdominal:      Palpations: Abdomen is soft. There is no mass.      Tenderness: There is no abdominal tenderness.   Musculoskeletal:         General: No deformity. Normal range of motion.      Cervical back: Normal range of motion and neck supple.   Skin:     General: Skin is warm.      Findings: No rash.   Neurological:      General: No focal deficit present.      Mental Status: He is alert and oriented to person, place, and time.   Psychiatric:         Mood and Affect: Mood normal.              Signed Electronically by: ANA MADDOX MD    "

## 2024-01-29 PROCEDURE — 82274 ASSAY TEST FOR BLOOD FECAL: CPT | Performed by: STUDENT IN AN ORGANIZED HEALTH CARE EDUCATION/TRAINING PROGRAM

## 2024-02-01 NOTE — RESULT ENCOUNTER NOTE
Kelsi Monson,     It is a pleasure providing you with medical care. I have received and reviewed your lab results, and have the following recommendations:     Elevated WBC on CBC which could be due to viral infection.     Cholesterol is in appropriate ranges.     Albumin in urine is within normal limits. Your Creatinine and GFR are improved from prior. Kidney is staying stable.     Sincerely,     Claudette Gayle MD

## 2024-02-03 LAB — HEMOCCULT STL QL IA: NEGATIVE

## 2024-08-25 ENCOUNTER — HEALTH MAINTENANCE LETTER (OUTPATIENT)
Age: 64
End: 2024-08-25

## 2025-01-28 ENCOUNTER — OFFICE VISIT (OUTPATIENT)
Dept: FAMILY MEDICINE | Facility: CLINIC | Age: 65
End: 2025-01-28
Payer: COMMERCIAL

## 2025-01-28 ENCOUNTER — ORDERS ONLY (AUTO-RELEASED) (OUTPATIENT)
Dept: FAMILY MEDICINE | Facility: CLINIC | Age: 65
End: 2025-01-28

## 2025-01-28 VITALS
BODY MASS INDEX: 28.02 KG/M2 | HEART RATE: 82 BPM | SYSTOLIC BLOOD PRESSURE: 126 MMHG | RESPIRATION RATE: 20 BRPM | TEMPERATURE: 98.1 F | HEIGHT: 69 IN | DIASTOLIC BLOOD PRESSURE: 76 MMHG | OXYGEN SATURATION: 99 % | WEIGHT: 189.2 LBS

## 2025-01-28 DIAGNOSIS — Z12.11 SCREEN FOR COLON CANCER: ICD-10-CM

## 2025-01-28 DIAGNOSIS — Z12.5 SCREENING FOR PROSTATE CANCER: ICD-10-CM

## 2025-01-28 DIAGNOSIS — E78.2 MIXED HYPERLIPIDEMIA: ICD-10-CM

## 2025-01-28 DIAGNOSIS — I10 BENIGN ESSENTIAL HTN: Primary | ICD-10-CM

## 2025-01-28 LAB
ANION GAP SERPL CALCULATED.3IONS-SCNC: 12 MMOL/L (ref 7–15)
BUN SERPL-MCNC: 21.6 MG/DL (ref 8–23)
CALCIUM SERPL-MCNC: 9.6 MG/DL (ref 8.8–10.4)
CHLORIDE SERPL-SCNC: 98 MMOL/L (ref 98–107)
CHOLEST SERPL-MCNC: 183 MG/DL
CREAT SERPL-MCNC: 1.37 MG/DL (ref 0.67–1.17)
EGFRCR SERPLBLD CKD-EPI 2021: 58 ML/MIN/1.73M2
FASTING STATUS PATIENT QL REPORTED: YES
FASTING STATUS PATIENT QL REPORTED: YES
GLUCOSE SERPL-MCNC: 109 MG/DL (ref 70–99)
HCO3 SERPL-SCNC: 25 MMOL/L (ref 22–29)
HDLC SERPL-MCNC: 49 MG/DL
LDLC SERPL CALC-MCNC: 111 MG/DL
NONHDLC SERPL-MCNC: 134 MG/DL
POTASSIUM SERPL-SCNC: 4.6 MMOL/L (ref 3.4–5.3)
PSA SERPL DL<=0.01 NG/ML-MCNC: 1.06 NG/ML (ref 0–4.5)
SODIUM SERPL-SCNC: 135 MMOL/L (ref 135–145)
TRIGL SERPL-MCNC: 115 MG/DL

## 2025-01-28 PROCEDURE — 80048 BASIC METABOLIC PNL TOTAL CA: CPT | Performed by: STUDENT IN AN ORGANIZED HEALTH CARE EDUCATION/TRAINING PROGRAM

## 2025-01-28 PROCEDURE — 80061 LIPID PANEL: CPT | Performed by: STUDENT IN AN ORGANIZED HEALTH CARE EDUCATION/TRAINING PROGRAM

## 2025-01-28 PROCEDURE — 99213 OFFICE O/P EST LOW 20 MIN: CPT | Performed by: STUDENT IN AN ORGANIZED HEALTH CARE EDUCATION/TRAINING PROGRAM

## 2025-01-28 PROCEDURE — G0103 PSA SCREENING: HCPCS | Performed by: STUDENT IN AN ORGANIZED HEALTH CARE EDUCATION/TRAINING PROGRAM

## 2025-01-28 PROCEDURE — 36415 COLL VENOUS BLD VENIPUNCTURE: CPT | Performed by: STUDENT IN AN ORGANIZED HEALTH CARE EDUCATION/TRAINING PROGRAM

## 2025-01-28 PROCEDURE — G2211 COMPLEX E/M VISIT ADD ON: HCPCS | Performed by: STUDENT IN AN ORGANIZED HEALTH CARE EDUCATION/TRAINING PROGRAM

## 2025-01-28 RX ORDER — ATORVASTATIN CALCIUM 40 MG/1
40 TABLET, FILM COATED ORAL DAILY
Qty: 90 TABLET | Refills: 3 | Status: CANCELLED | OUTPATIENT
Start: 2025-01-28

## 2025-01-28 RX ORDER — LISINOPRIL AND HYDROCHLOROTHIAZIDE 10; 12.5 MG/1; MG/1
1 TABLET ORAL DAILY
Qty: 90 TABLET | Refills: 3 | Status: SHIPPED | OUTPATIENT
Start: 2025-01-28

## 2025-01-28 RX ORDER — ROSUVASTATIN CALCIUM 20 MG/1
20 TABLET, COATED ORAL DAILY
Qty: 90 TABLET | Refills: 3 | Status: SHIPPED | OUTPATIENT
Start: 2025-01-28

## 2025-01-28 ASSESSMENT — PAIN SCALES - GENERAL: PAINLEVEL_OUTOF10: NO PAIN (0)

## 2025-01-28 NOTE — PROGRESS NOTES
"  Assessment & Plan     Benign essential hypertension,  currently at goal less than 140/90  - BASIC METABOLIC PANEL; Future  - refilled lisinopril-hydrochlorothiazide (ZESTORETIC) 10-12.5 MG tablet; Take 1 tablet by mouth daily.    Mixed hyperlipidemia  > given muscle aches/joint pain, can consider switching lipitor to an alternative statin after shared discussions were had, patient interested in switching from Lipitor to Crestor instead of Lipitor to pravastatin which is reasonable given Crestor we will continue given high intensity coverage that the pravastatin would not   - Lipid panel reflex to direct LDL Fasting; Future  - rosuvastatin (CRESTOR) 20 MG tablet; Take 1 tablet (20 mg) by mouth daily. If you experience muscle/joint pain on the 20mg, cut the dose in half to 10mg, if you still have muscle/joint pain then notify the clinic  - if muscle aches continue, despite being switched to the Crestor, can try pravastatin instead    Screen for colon cancer  > FIT test last year was within normal limits patient has no personal/family history of colorectal cancer   - COLANTONELLA([employer]); Future    Screening for prostate cancer  - PSA, screen; Future      The longitudinal plan of care for the diagnosis(es)/condition(s) as documented were addressed during this visit. Due to the added complexity in care, I will continue to support Al in the subsequent management and with ongoing continuity of care.      BMI  Estimated body mass index is 27.64 kg/m  as calculated from the following:    Height as of this encounter: 1.762 m (5' 9.37\").    Weight as of this encounter: 85.8 kg (189 lb 3.2 oz).         Subjective   Al is a 64 year old, presenting for the following health issues:  Recheck Medication, Hypertension, Lipids, and Health Maintenance (Declines vaccinations today)        1/28/2025     8:03 AM   Additional Questions   Roomed by Caren LOUIS LPN   Accompanied by self         1/28/2025     8:03 AM   Patient Reported " "Additional Medications   Patient reports taking the following new medications no new meds     History of Present Illness       Hyperlipidemia:  He presents for follow up of hyperlipidemia.   He is taking medication to lower cholesterol. He is having (would like to discuss maybe changing the medication) myalgia or other side effects to statin medications.    Hypertension: He presents for follow up of hypertension.  He does check blood pressure  regularly outside of the clinic. Outpatient blood pressures have not been over 140/90. He does not follow a low salt diet.     He eats 2-3 servings of fruits and vegetables daily.He consumes 0 sweetened beverage(s) daily.He exercises with enough effort to increase his heart rate 9 or less minutes per day.  He exercises with enough effort to increase his heart rate 3 or less days per week.   He is taking medications regularly.     Medication Followup of Lisinopril-hydrochlorothiazide 10-12.5 mg  Taking Medication as prescribed: yes  Side Effects:  None  Medication Helping Symptoms:  yes    ROS:   As above         Objective    /76 (BP Location: Left arm, Patient Position: Sitting, Cuff Size: Adult Regular)   Pulse 82   Temp 98.1  F (36.7  C) (Tympanic)   Resp 20   Ht 1.762 m (5' 9.37\")   Wt 85.8 kg (189 lb 3.2 oz)   SpO2 99%   BMI 27.64 kg/m    Body mass index is 27.64 kg/m .  Physical Exam  Constitutional:       General: He is not in acute distress.  HENT:      Head: Normocephalic and atraumatic.      Right Ear: External ear normal.      Left Ear: External ear normal.      Mouth/Throat:      Mouth: Mucous membranes are moist.      Pharynx: Oropharynx is clear. No oropharyngeal exudate or posterior oropharyngeal erythema.   Eyes:      Extraocular Movements: Extraocular movements intact.   Cardiovascular:      Rate and Rhythm: Normal rate and regular rhythm.      Heart sounds: Normal heart sounds.   Pulmonary:      Effort: Pulmonary effort is normal. No respiratory " distress.      Breath sounds: Normal breath sounds. No wheezing or rhonchi.   Abdominal:      Palpations: Abdomen is soft. There is no mass.      Tenderness: There is no abdominal tenderness.   Musculoskeletal:         General: No deformity. Normal range of motion.      Cervical back: Normal range of motion and neck supple.   Skin:     General: Skin is warm.      Findings: No rash.   Neurological:      General: No focal deficit present.      Mental Status: He is alert and oriented to person, place, and time.   Psychiatric:         Mood and Affect: Mood normal.                  Signed Electronically by: ANA MADDOX MD

## 2025-01-28 NOTE — RESULT ENCOUNTER NOTE
"Kelsi Monson,     It is a pleasure providing you with medical care. I have received and reviewed your results, and have the following recommendations:     Based on your cholesterol results, your LDL \"Lousy\" cholesterol is elevated compared to your last year.     Let's see how you respond to the Crestor 20mg and if you are not having muscle/joint pain with that medication than we can consider increasing the dose of that medication going forward to help decrease your risk for future heart attack or stroke.     Other things you can do to help protect your heart health include:Try to limit your dietary intake of fatty, greasy, oily, fried, take out, and fast food when possible. Also, I would suggest you cut back on red and processed meats, sodium and sugar-sweetened foods and beverages. Regarding exercise, please get at least 30 minutes of CONTINUOUS moderate intensity aerobic exercise at least 3 times per week.    Your basic metabolic panel did show that you had elevated creatinine with decreased GFR.  This is a sign of kidney strain.  At this time no medication changes are needed, however it is very important that you work on increasing fluid intake (64 ounces of water intake daily) to help maintain kidney health. Your glucose was mildly elevated to help with sugar control please: Try to limit your dietary intake of carbohydrate rich foods like rice, pasta, breads, potatoes, sweets. Additionally, limit your intake of drinks with added sugars, such as juice, regular soda, and regular sports or energy drinks.  It is ok for you to have lean meats, chicken, turkey, fish, eggs, nuts, beans, lentils, and tofu. With regards to exercise, try to get at least 30 minutes of CONTINUOUS aerobic exercise at least 3 times per week.       Your PSA was within normal limits suggesting that you are not suspected having prostate cancer at this time.     Sincerely,     Claudette Gayle MD      "

## 2025-03-03 LAB — NONINV COLON CA DNA+OCC BLD SCRN STL QL: POSITIVE

## 2025-03-04 ENCOUNTER — PATIENT OUTREACH (OUTPATIENT)
Dept: GASTROENTEROLOGY | Facility: CLINIC | Age: 65
End: 2025-03-04
Payer: COMMERCIAL

## 2025-03-04 DIAGNOSIS — Z12.11 SPECIAL SCREENING FOR MALIGNANT NEOPLASMS, COLON: Primary | ICD-10-CM

## 2025-03-04 NOTE — PROGRESS NOTES
"Patient has had a positive cologuard. Screening colonoscopy is being recommended to be completed within 3 months per protocol. Patient notified via [employer].     [facility] Screening Colonoscopy Referral Review    Patient meets the inclusion criteria for screening colonoscopy standing order.    Ordering/Referring Provider:  Claudette Gayle      BMI: Estimated body mass index is 27.64 kg/m  as calculated from the following:    Height as of 1/28/25: 1.762 m (5' 9.37\").    Weight as of 1/28/25: 85.8 kg (189 lb 3.2 oz).     Sedation:  Does patient have any of the following conditions affecting sedation?  No medical conditions affecting sedation.    Previous Scopes:  Any previous recommendations or follow up needs based on previous scope?  na / No recommendations.    Medical Concerns to Postpone Order:  Does patient have any of the following medical concerns that should postpone/delay colonoscopy referral?  No medical conditions affecting colonoscopy referral.    Final Referral Details:  Based on patient's medical history patient is appropriate for referral order with moderate sedation. If patient's BMI > 50 do not schedule in [facility].  "

## 2025-04-29 ENCOUNTER — OFFICE VISIT (OUTPATIENT)
Dept: FAMILY MEDICINE | Facility: CLINIC | Age: 65
End: 2025-04-29
Payer: COMMERCIAL

## 2025-04-29 VITALS
RESPIRATION RATE: 16 BRPM | OXYGEN SATURATION: 99 % | SYSTOLIC BLOOD PRESSURE: 128 MMHG | TEMPERATURE: 98.2 F | BODY MASS INDEX: 27.76 KG/M2 | HEIGHT: 70 IN | HEART RATE: 88 BPM | DIASTOLIC BLOOD PRESSURE: 76 MMHG | WEIGHT: 193.9 LBS

## 2025-04-29 DIAGNOSIS — L50.9 DIFFUSE URTICARIA: Primary | ICD-10-CM

## 2025-04-29 DIAGNOSIS — L98.9 SKIN LESION: ICD-10-CM

## 2025-04-29 PROCEDURE — 3074F SYST BP LT 130 MM HG: CPT | Performed by: STUDENT IN AN ORGANIZED HEALTH CARE EDUCATION/TRAINING PROGRAM

## 2025-04-29 PROCEDURE — 1126F AMNT PAIN NOTED NONE PRSNT: CPT | Performed by: STUDENT IN AN ORGANIZED HEALTH CARE EDUCATION/TRAINING PROGRAM

## 2025-04-29 PROCEDURE — 11102 TANGNTL BX SKIN SINGLE LES: CPT | Performed by: STUDENT IN AN ORGANIZED HEALTH CARE EDUCATION/TRAINING PROGRAM

## 2025-04-29 PROCEDURE — 99213 OFFICE O/P EST LOW 20 MIN: CPT | Mod: 25 | Performed by: STUDENT IN AN ORGANIZED HEALTH CARE EDUCATION/TRAINING PROGRAM

## 2025-04-29 PROCEDURE — 3078F DIAST BP <80 MM HG: CPT | Performed by: STUDENT IN AN ORGANIZED HEALTH CARE EDUCATION/TRAINING PROGRAM

## 2025-04-29 RX ORDER — PREDNISONE 20 MG/1
TABLET ORAL
Qty: 20 TABLET | Refills: 0 | Status: SHIPPED | OUTPATIENT
Start: 2025-04-29

## 2025-04-29 ASSESSMENT — PAIN SCALES - GENERAL: PAINLEVEL_OUTOF10: NO PAIN (0)

## 2025-04-29 NOTE — PROGRESS NOTES
Assessment & Plan     Diffuse urticaria  > suspect allergic reaction to environment rather than medications, ok to restart his statin since his rash worsened despite stopping the medication   - predniSONE (DELTASONE) 20 MG tablet; Take 3 tabs by mouth daily x 3 days, then 2 tabs daily x 3 days, then 1 tab daily x 3 days, then 1/2 tab daily x 3 days.    Skin lesion  > s/p shave biopsy, see proc note below   - patient tolerated procedure without complications, aftercare instructions discussed   - Dermatological Path Order and Indications      Subjective   Al is a 64 year old, presenting for the following health issues:  Rash        4/29/2025     9:59 AM   Additional Questions   Roomed by Caren LOUIS LPN   Accompanied by self         4/29/2025     9:59 AM   Patient Reported Additional Medications   Patient reports taking the following new medications no new meds     History of Present Illness       Reason for visit:  Rash  Symptom onset:  1-2 weeks ago  Symptoms include:  Rash  Symptom intensity:  Moderate  Symptom progression:  Worsening  Had these symptoms before:  No  What makes it worse:  No  What makes it better:  No   He is taking medications regularly.          Rash  Onset/Duration: 1-2 weeks ago  Description  Location: all over, his arms, legs, hands, shoulders and now his back   Character: blotchy, red  Itching: severe  Intensity:  moderate  Progression of Symptoms:  worsening  Accompanying signs and symptoms:   Fever: No  Body aches or joint pain: No  Sore throat symptoms: No  Recent cold symptoms: No  History:           Previous episodes of similar rash: None  New exposures:  medication rosuvastatin   Recent travel: No  Exposure to similar rash: No  Precipitating or alleviating factors: none  Therapies tried and outcome: hydrocortisone cream -  relieves the itching but not the rash. Stopped taking rosuvastatin last week at rash onset and hasn't noticed a change so doesn't believe it is the  "medication.    Reports that even after stopping his medication his rashes continued to spread instead of improve   No new detergents traveling clothes       ROS:   As above         Objective    /76 (BP Location: Left arm, Patient Position: Sitting, Cuff Size: Adult Regular)   Pulse 88   Temp 98.2  F (36.8  C) (Tympanic)   Resp 16   Ht 1.77 m (5' 9.69\")   Wt 88 kg (193 lb 14.4 oz)   SpO2 99%   BMI 28.07 kg/m    Body mass index is 28.07 kg/m .  Physical Exam  Constitutional:       General: He is not in acute distress.     Appearance: Normal appearance.   HENT:      Head: Normocephalic.   Eyes:      General: No scleral icterus.        Right eye: No discharge.         Left eye: No discharge.      Extraocular Movements: Extraocular movements intact.      Conjunctiva/sclera: Conjunctivae normal.   Pulmonary:      Effort: Pulmonary effort is normal. No respiratory distress.   Musculoskeletal:         General: Normal range of motion.      Cervical back: Normal range of motion.   Skin:     General: Skin is warm.      Findings: Rash present.      Comments: Diffuse urticaria appreciated on BUE   See image uploaded below of his lateral LLE    Neurological:      General: No focal deficit present.      Mental Status: He is alert and oriented to person, place, and time.   Psychiatric:         Mood and Affect: Mood normal.         Behavior: Behavior normal.                   SHAVE BIOPSY PROCEDURE NOTE: After written informed consent was obtained, a time out was taken to identify the patient and the correct site for biopsy. The lesion on the lateral LLE was cleansed with a 70% isopropyl alcohol wipe, and then injected with 2cc of lidocaine 1% with epinephrine 1:100,000. Once anesthesia was ensured, the visible surface of the lesion was biopsied using a Dyana blade in standard technique. Hemostasis was obtained with pressure and aluminum chloride 20% solution. The specimen was placed in a labeled formalin container " and sent to pathology for sectioning and analysis. The wound was dressed with mupirocin and an adhesive bandage. The patient tolerated the procedure well. Post-procedure instructions and recommendations were provided.      Signed Electronically by: ANA MADDOX MD

## 2025-05-05 LAB
PATH REPORT.COMMENTS IMP SPEC: NORMAL
PATH REPORT.FINAL DX SPEC: NORMAL
PATH REPORT.GROSS SPEC: NORMAL
PATH REPORT.MICROSCOPIC SPEC OTHER STN: NORMAL
PATH REPORT.RELEVANT HX SPEC: NORMAL